# Patient Record
Sex: MALE | Race: WHITE | NOT HISPANIC OR LATINO | Employment: STUDENT | URBAN - METROPOLITAN AREA
[De-identification: names, ages, dates, MRNs, and addresses within clinical notes are randomized per-mention and may not be internally consistent; named-entity substitution may affect disease eponyms.]

---
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
## 2023-10-12 PROBLEM — Z00.00 ROUTINE GENERAL MEDICAL EXAMINATION AT A HEALTH CARE FACILITY: Status: ACTIVE | Noted: 2023-10-12

## 2023-10-12 PROBLEM — Z00.00 HEALTHCARE MAINTENANCE: Status: ACTIVE | Noted: 2023-10-12

## 2023-10-12 NOTE — ASSESSMENT & PLAN NOTE
Health care maintenance and core gaps reviewed and assessed with patient.  Vaccinations recommended:        Tetanus - O       Shingles - N/A       Influenza - O       Pneumonia - N/A   Colon cancer screening:   Colonoscopy: N/A  Lifestyle recommendations given.  Physical activity recommended.    Legend: Ordered (O), Declined (D), Current (C)

## 2023-10-13 ENCOUNTER — OFFICE VISIT (OUTPATIENT)
Dept: INTERNAL MEDICINE | Facility: CLINIC | Age: 23
End: 2023-10-13
Payer: COMMERCIAL

## 2023-10-13 ENCOUNTER — TELEPHONE (OUTPATIENT)
Dept: INTERNAL MEDICINE | Facility: CLINIC | Age: 23
End: 2023-10-13

## 2023-10-13 ENCOUNTER — CLINICAL SUPPORT (OUTPATIENT)
Dept: INTERNAL MEDICINE | Facility: CLINIC | Age: 23
End: 2023-10-13
Payer: COMMERCIAL

## 2023-10-13 VITALS
WEIGHT: 163.81 LBS | SYSTOLIC BLOOD PRESSURE: 122 MMHG | BODY MASS INDEX: 22.93 KG/M2 | DIASTOLIC BLOOD PRESSURE: 69 MMHG | HEIGHT: 71 IN | HEART RATE: 87 BPM

## 2023-10-13 DIAGNOSIS — Z00.00 ROUTINE GENERAL MEDICAL EXAMINATION AT A HEALTH CARE FACILITY: Primary | ICD-10-CM

## 2023-10-13 DIAGNOSIS — E78.5 HYPERLIPIDEMIA, UNSPECIFIED HYPERLIPIDEMIA TYPE: ICD-10-CM

## 2023-10-13 DIAGNOSIS — E03.8 SUBCLINICAL HYPOTHYROIDISM: ICD-10-CM

## 2023-10-13 DIAGNOSIS — F98.8 ATTENTION DEFICIT DISORDER (ADD) WITHOUT HYPERACTIVITY: ICD-10-CM

## 2023-10-13 DIAGNOSIS — Z00.00 ANNUAL PHYSICAL EXAM: Primary | ICD-10-CM

## 2023-10-13 DIAGNOSIS — Z00.00 HEALTHCARE MAINTENANCE: ICD-10-CM

## 2023-10-13 LAB
ALBUMIN SERPL BCP-MCNC: 4.5 G/DL (ref 3.5–5.2)
ALP SERPL-CCNC: 79 U/L (ref 55–135)
ALT SERPL W/O P-5'-P-CCNC: 29 U/L (ref 10–44)
ANION GAP SERPL CALC-SCNC: 9 MMOL/L (ref 8–16)
AST SERPL-CCNC: 19 U/L (ref 10–40)
BILIRUB SERPL-MCNC: 0.4 MG/DL (ref 0.1–1)
BUN SERPL-MCNC: 10 MG/DL (ref 6–20)
CALCIUM SERPL-MCNC: 9.6 MG/DL (ref 8.7–10.5)
CHLORIDE SERPL-SCNC: 102 MMOL/L (ref 95–110)
CHOLEST SERPL-MCNC: 429 MG/DL (ref 120–199)
CHOLEST/HDLC SERPL: 10 {RATIO} (ref 2–5)
CO2 SERPL-SCNC: 26 MMOL/L (ref 23–29)
CREAT SERPL-MCNC: 0.9 MG/DL (ref 0.5–1.4)
ERYTHROCYTE [DISTWIDTH] IN BLOOD BY AUTOMATED COUNT: 13.6 % (ref 11.5–14.5)
EST. GFR  (NO RACE VARIABLE): >60 ML/MIN/1.73 M^2
ESTIMATED AVG GLUCOSE: 103 MG/DL (ref 68–131)
GLUCOSE SERPL-MCNC: 102 MG/DL (ref 70–110)
HBA1C MFR BLD: 5.2 % (ref 4–5.6)
HCT VFR BLD AUTO: 46.1 % (ref 40–54)
HDLC SERPL-MCNC: 43 MG/DL (ref 40–75)
HDLC SERPL: 10 % (ref 20–50)
HGB BLD-MCNC: 15.4 G/DL (ref 14–18)
LDLC SERPL CALC-MCNC: 349.2 MG/DL (ref 63–159)
MCH RBC QN AUTO: 30.4 PG (ref 27–31)
MCHC RBC AUTO-ENTMCNC: 33.4 G/DL (ref 32–36)
MCV RBC AUTO: 91 FL (ref 82–98)
NONHDLC SERPL-MCNC: 386 MG/DL
PLATELET # BLD AUTO: 292 K/UL (ref 150–450)
PMV BLD AUTO: 9.4 FL (ref 9.2–12.9)
POTASSIUM SERPL-SCNC: 4 MMOL/L (ref 3.5–5.1)
PROT SERPL-MCNC: 7.5 G/DL (ref 6–8.4)
RBC # BLD AUTO: 5.06 M/UL (ref 4.6–6.2)
SODIUM SERPL-SCNC: 137 MMOL/L (ref 136–145)
T4 FREE SERPL-MCNC: 0.84 NG/DL (ref 0.71–1.51)
TRIGL SERPL-MCNC: 184 MG/DL (ref 30–150)
TSH SERPL DL<=0.005 MIU/L-ACNC: 4.72 UIU/ML (ref 0.4–4)
WBC # BLD AUTO: 7.3 K/UL (ref 3.9–12.7)

## 2023-10-13 PROCEDURE — 99385 PR PREVENTIVE VISIT,NEW,18-39: ICD-10-PCS | Mod: S$GLB,,, | Performed by: STUDENT IN AN ORGANIZED HEALTH CARE EDUCATION/TRAINING PROGRAM

## 2023-10-13 PROCEDURE — 84439 ASSAY OF FREE THYROXINE: CPT | Performed by: STUDENT IN AN ORGANIZED HEALTH CARE EDUCATION/TRAINING PROGRAM

## 2023-10-13 PROCEDURE — 80061 LIPID PANEL: CPT | Performed by: STUDENT IN AN ORGANIZED HEALTH CARE EDUCATION/TRAINING PROGRAM

## 2023-10-13 PROCEDURE — 99385 PREV VISIT NEW AGE 18-39: CPT | Mod: S$GLB,,, | Performed by: STUDENT IN AN ORGANIZED HEALTH CARE EDUCATION/TRAINING PROGRAM

## 2023-10-13 PROCEDURE — 83036 HEMOGLOBIN GLYCOSYLATED A1C: CPT | Performed by: STUDENT IN AN ORGANIZED HEALTH CARE EDUCATION/TRAINING PROGRAM

## 2023-10-13 PROCEDURE — 84443 ASSAY THYROID STIM HORMONE: CPT | Performed by: STUDENT IN AN ORGANIZED HEALTH CARE EDUCATION/TRAINING PROGRAM

## 2023-10-13 PROCEDURE — 99999 PR PBB SHADOW E&M-EST. PATIENT-LVL IV: ICD-10-PCS | Mod: PBBFAC,,, | Performed by: STUDENT IN AN ORGANIZED HEALTH CARE EDUCATION/TRAINING PROGRAM

## 2023-10-13 PROCEDURE — 99999 PR PBB SHADOW E&M-EST. PATIENT-LVL IV: CPT | Mod: PBBFAC,,, | Performed by: STUDENT IN AN ORGANIZED HEALTH CARE EDUCATION/TRAINING PROGRAM

## 2023-10-13 PROCEDURE — 80053 COMPREHEN METABOLIC PANEL: CPT | Performed by: STUDENT IN AN ORGANIZED HEALTH CARE EDUCATION/TRAINING PROGRAM

## 2023-10-13 PROCEDURE — 85027 COMPLETE CBC AUTOMATED: CPT | Performed by: STUDENT IN AN ORGANIZED HEALTH CARE EDUCATION/TRAINING PROGRAM

## 2023-10-13 NOTE — PROGRESS NOTES
Subjective:       Patient ID: Melinda Angel is a 23 y.o. male.    Chief Complaint: [employer]    HPI    Melinda Angel is a 23 y.o. male , English speaking, with a history of:  ADD      [Local Patient]  Originally from [facility]  Lives in: [place] [address]       Patient comes to the clinic a general medical health examination through [facility].    Patient is currently asymptomatic and has no complaints.  Patient is single, lives with 3 roommates.    He says he tries to have a healthy diet and cooks for himself.    He was diagnosed with ADD at age 11, at the moment he is not on medication.    Patient denies CV symptoms, CP, SOB, palpitations.  Patient denies constitutional symptoms, fever, changes in the urine or stool.    PMH:  Past Medical History:   Diagnosis Date    ADD (attention deficit disorder) 2012       PSH:  History reviewed. No pertinent surgical history.    FH:  History reviewed. No pertinent family history.    Allergies: Negative  Review of patient's allergies indicates:  No Known Allergies    Meds:    Current Outpatient Medications:     methylphenidate HCl (RITALIN) 10 MG tablet, Take 1 and 1/2 tablet po Q 3 pm daily for extended study time, Disp: 45 tablet, Rfl: 0    Social:  Single, no children, not dating at the moment.  Lives with 3 roommates.  Full time employed as     Exercise: Insufficient physical activity    Diet: Regular with no restrictions    Tobacco: Denies  Tobacco Use: Low Risk  (10/13/2023)    Patient History     Smoking Tobacco Use: Never     Smokeless Tobacco Use: Never     Passive Exposure: Not on file        Alcohol: Denies    Recreational drug use: Occasional marihuana    Recent travel: Denies      Most recent laboratories reviewed:    Recent Labs   Lab 10/13/23  0756   WBC 7.30   Hemoglobin 15.4   Hematocrit 46.1   MCV 91   Platelets 292       Recent Labs   Lab 10/13/23  0756   Glucose 102   Sodium 137   Potassium 4.0   BUN 10   Creatinine 0.9   Total  "Bilirubin 0.4   AST 19   ALT 29       Recent Labs   Lab 10/13/23  0756   Hemoglobin A1C 5.2       Recent Labs   Lab 10/13/23  0756   Cholesterol 429 H   Triglycerides 184 H   HDL 43   LDL Cholesterol 349.2 H   Non-HDL Cholesterol 386       Recent Labs   Lab 10/13/23  0756   TSH 4.716 H               Healthcare Maintenance:  Colon cancer screening:   N/A  Vaccinations: Pneumonia, Zoster, Tetanus (no)  COVID vaccination: completed  x 3   Depression screening: PHQ2 score = 0    Annual visit approx. Month: October ROS  11-point review of systems done. Negative except for detailed in the HPI.    Occasional dry cough, unknown cause.      Objective:      /69   Pulse 87   Ht 5' 11" (1.803 m)   Wt 74.3 kg (163 lb 12.8 oz)   BMI 22.85 kg/m²      Physical Exam   General appearance: Good general aspect, NAD, conversant   Eyes and HEENT: Normal sclerae, moist mucous membranes, no thyromegaly or lymphadenopathy  Respiratory: No work of breathing, clear to auscultation bilaterally. No rales, rhonchi, wheezing, or rubs.  Cardiovascular: PMI not displaced. RRR. Normal S1, S2. No murmurs, rubs or gallops.  Abdomen and : Soft, non-tender, nondistended, BS, no hepatosplenomegaly, no masses.  Extremities: no edemas, no extremity lymphadenopathy, no clubbing, no cyanosis.  Nervous System: Alert and oriented x 3, good concentration, no deficits.  Skin: Normal temperature, turgor and texture; no rash, ulcers or subcutaneous nodules  Psych: Appropriate affect, alert and oriented to person, place and time          Assessment:       1. Routine general medical examination at a health care facility  Assessment & Plan:  Patient is in overall good general health.  Stable medical conditions listed on the PMH.  Labs reviewed and patient notified.        2. Attention deficit disorder (ADD) without hyperactivity  Overview:  Dx at age 11    Assessment & Plan:  Stable  Not on medication      3. Hyperlipidemia, unspecified hyperlipidemia " "type  Assessment & Plan:  Familial?  Lab Results   Component Value Date    CHOL 429 (H) 10/13/2023     Lab Results   Component Value Date    HDL 43 10/13/2023     Lab Results   Component Value Date    LDLCALC 349.2 (H) 10/13/2023     No results found for: "DLDL"  Lab Results   Component Value Date    TRIG 184 (H) 10/13/2023       f1   Lab Results   Component Value Date    CHOLHDL 10.0 (L) 10/13/2023     Will refer to endocrinology for evaluation.  Low-cholesterol / lipid diet recommended  Physical activity recommended.    Orders:  -     Ambulatory referral/consult to Endocrinology; Future; Expected date: 10/20/2023    4. Subclinical hypothyroidism  Assessment & Plan:  Asymptomatic.  Lab Results   Component Value Date    TSH 4.716 (H) 10/13/2023     Will refer to endocrinology for evaluation in view of very elevated lipids.      5. Healthcare maintenance  Assessment & Plan:  Health care maintenance and core gaps reviewed and assessed with patient.  Vaccinations recommended:        Tetanus - O       Shingles - N/A       Influenza - O       Pneumonia - N/A   Colon cancer screening:   Colonoscopy: N/A  Lifestyle recommendations given.  Physical activity recommended.    Legend: Ordered (O), Declined (D), Current (C)      Orders:  -     Tdap Vaccine; Future  -     Influenza - Quadrivalent (PF); Future; Expected date: 10/13/2023       Plan:       Vaccinations ordered: Influenza, Tdap  Referral to endocrinology.      Will assume as PCP.      Patient Instructions   Get vaccinated against influenza and tetanus.  Recommended to increase frequency of physical activity       Education provided  Lifestyle recommendations given  AVS generated, explained, and sent to the patient through the patient portal.  RTC in : 1 year for annual wellness visit, labs not ordered yet          YANCI VILLAVICENCIO MD, MPH  Internal Medicine  [facility]  [facility]          "

## 2023-10-13 NOTE — PATIENT INSTRUCTIONS
Get vaccinated against influenza and tetanus.  Recommended to increase frequency of physical activity

## 2023-10-13 NOTE — ASSESSMENT & PLAN NOTE
Asymptomatic.  Lab Results   Component Value Date    TSH 4.716 (H) 10/13/2023     Will refer to endocrinology for evaluation in view of very elevated lipids.

## 2023-10-27 ENCOUNTER — OFFICE VISIT (OUTPATIENT)
Dept: ENDOCRINOLOGY | Facility: CLINIC | Age: 23
End: 2023-10-27
Payer: COMMERCIAL

## 2023-10-27 VITALS
HEART RATE: 84 BPM | WEIGHT: 158.94 LBS | OXYGEN SATURATION: 97 % | SYSTOLIC BLOOD PRESSURE: 100 MMHG | BODY MASS INDEX: 22.17 KG/M2 | DIASTOLIC BLOOD PRESSURE: 66 MMHG

## 2023-10-27 DIAGNOSIS — E03.8 SUBCLINICAL HYPOTHYROIDISM: ICD-10-CM

## 2023-10-27 DIAGNOSIS — E78.5 HYPERLIPIDEMIA, UNSPECIFIED HYPERLIPIDEMIA TYPE: Primary | ICD-10-CM

## 2023-10-27 PROCEDURE — 99204 OFFICE O/P NEW MOD 45 MIN: CPT | Mod: S$GLB,,, | Performed by: INTERNAL MEDICINE

## 2023-10-27 PROCEDURE — 99999 PR PBB SHADOW E&M-EST. PATIENT-LVL III: ICD-10-PCS | Mod: PBBFAC,,, | Performed by: INTERNAL MEDICINE

## 2023-10-27 PROCEDURE — 99999 PR PBB SHADOW E&M-EST. PATIENT-LVL III: CPT | Mod: PBBFAC,,, | Performed by: INTERNAL MEDICINE

## 2023-10-27 PROCEDURE — 99204 PR OFFICE/OUTPT VISIT, NEW, LEVL IV, 45-59 MIN: ICD-10-PCS | Mod: S$GLB,,, | Performed by: INTERNAL MEDICINE

## 2023-10-27 NOTE — PROGRESS NOTES
Subjective:      Patient ID: Melinda Angel is referred by Jania Saleh MD     Chief Complaint:  Hyperlipidemia    History of Present Illness    Melinda Angel is a 23 y.o. male who presents for evaluation of hyperlipidemia.    Patient noted to have elevated LDL and total cholesterol during his routine physical exam.    Lab Results   Component Value Date    LDLCALC 349.2 (H) 10/13/2023    CHOL 429 (H) 10/13/2023    TRIG 184 (H) 10/13/2023    HDL 43 10/13/2023     Fhx of hyperlipidemia: Mother, maternal uncle and grandfather.  No diagnosis of familial hypercholesterolemia.  Cholesterol deposits under the skin: Denies   Premature coronary heart disease family member: Unknown, Paternal grandmother had MI.   Sudden premature cardiac death in a family member: Denies    Patient says recently he had been taking more fast food due to cost constraints.  Patient says he will make changes to his diet and lifestyle.      Hypothyroidism    Recent TSH was elevated with a normal free T4.  Prior labs not available for review.  No previous thyroid hormone use.    Thyroid symptoms:  On and off fatigue.    Family history of thyroid disease: Denies    Lab Results   Component Value Date    TSH 4.716 (H) 10/13/2023    FREET4 0.84 10/13/2023       ROS:   As above    Objective:     /66 (BP Location: Right arm, Patient Position: Sitting, BP Method: Large (Automatic))   Pulse 84   Wt 72.1 kg (158 lb 15.2 oz)   SpO2 97%   BMI 22.17 kg/m²     Body mass index is 22.17 kg/m².      Physical Exam  Constitutional:       General: He is not in acute distress.     Appearance: He is not ill-appearing.   HENT:      Head: Normocephalic.   Eyes:      Conjunctiva/sclera: Conjunctivae normal.   Cardiovascular:      Rate and Rhythm: Normal rate.   Pulmonary:      Effort: Pulmonary effort is normal.   Musculoskeletal:      Right lower leg: No edema.      Left lower leg: No edema.   Skin:     General: Skin is warm and dry.   Neurological:       Mental Status: He is alert and oriented to person, place, and time.       Lab Review:   Lab Results   Component Value Date    HGBA1C 5.2 10/13/2023     Lab Results   Component Value Date    CHOL 429 (H) 10/13/2023    HDL 43 10/13/2023    LDLCALC 349.2 (H) 10/13/2023    TRIG 184 (H) 10/13/2023    CHOLHDL 10.0 (L) 10/13/2023     Lab Results   Component Value Date     10/13/2023    K 4.0 10/13/2023     10/13/2023    CO2 26 10/13/2023     10/13/2023    BUN 10 10/13/2023    CREATININE 0.9 10/13/2023    CALCIUM 9.6 10/13/2023    PROT 7.5 10/13/2023    ALBUMIN 4.5 10/13/2023    BILITOT 0.4 10/13/2023    ALKPHOS 79 10/13/2023    AST 19 10/13/2023    ALT 29 10/13/2023    ANIONGAP 9 10/13/2023    EGFRNORACEVR >60.0 10/13/2023    TSH 4.716 (H) 10/13/2023        Assessment and Plan     Problem List Items Addressed This Visit          Cardiac/Vascular    Hyperlipidemia - Primary       Elevated LDL and total cholesterol during his routine physical exam.  LDL of 349, concerning for FH.  No known family history FH but has hyperlipidemia in his mother, maternal uncle and grandfather.  Family history of CAD.    Discussed referral the genetic clinic for evaluation of FH.  Encouraged good diet and lifestyle modification.   Repeat labs and have discussed starting statins with the patient.             Relevant Orders    Ambulatory referral/consult to Genetics    LDL Cholesterol, Direct Measurement       Endocrine    Subclinical hypothyroidism       Recent TSH was elevated with a normal free T4.  Reports on and off fatigue.  Repeat thyroid function test in 4 weeks.  Discussed starting levothyroxine if he has persistent elevated TSH.             Relevant Orders    TSH    T4, Free          Victoria GLOVER Rai, MD

## 2023-11-13 NOTE — ASSESSMENT & PLAN NOTE
Recent TSH was elevated with a normal free T4.  Reports on and off fatigue.  Repeat thyroid function test in 4 weeks.  Discussed starting levothyroxine if he has persistent elevated TSH.

## 2023-12-08 ENCOUNTER — LAB VISIT (OUTPATIENT)
Dept: LAB | Facility: HOSPITAL | Age: 23
End: 2023-12-08
Payer: COMMERCIAL

## 2023-12-08 DIAGNOSIS — E78.5 HYPERLIPIDEMIA, UNSPECIFIED HYPERLIPIDEMIA TYPE: ICD-10-CM

## 2023-12-08 DIAGNOSIS — E03.8 SUBCLINICAL HYPOTHYROIDISM: ICD-10-CM

## 2023-12-08 LAB
T4 FREE SERPL-MCNC: 0.78 NG/DL (ref 0.71–1.51)
TSH SERPL DL<=0.005 MIU/L-ACNC: 1.89 UIU/ML (ref 0.4–4)

## 2023-12-08 PROCEDURE — 83701 LIPOPROTEIN BLD HR FRACTION: CPT | Performed by: INTERNAL MEDICINE

## 2023-12-08 PROCEDURE — 36415 COLL VENOUS BLD VENIPUNCTURE: CPT | Mod: PN | Performed by: INTERNAL MEDICINE

## 2023-12-08 PROCEDURE — 84439 ASSAY OF FREE THYROXINE: CPT | Performed by: INTERNAL MEDICINE

## 2023-12-08 PROCEDURE — 84443 ASSAY THYROID STIM HORMONE: CPT | Performed by: INTERNAL MEDICINE

## 2023-12-10 LAB — LDLC SERPL-MCNC: 318 MG/DL

## 2023-12-12 DIAGNOSIS — E78.2 MIXED HYPERLIPIDEMIA: Primary | ICD-10-CM

## 2023-12-12 RX ORDER — ROSUVASTATIN CALCIUM 20 MG/1
20 TABLET, COATED ORAL DAILY
Qty: 30 TABLET | Refills: 11 | Status: SHIPPED | OUTPATIENT
Start: 2023-12-12 | End: 2024-01-11 | Stop reason: DRUGHIGH

## 2023-12-14 ENCOUNTER — PATIENT MESSAGE (OUTPATIENT)
Dept: INTERNAL MEDICINE | Facility: CLINIC | Age: 23
End: 2023-12-14
Payer: COMMERCIAL

## 2024-01-09 ENCOUNTER — LAB VISIT (OUTPATIENT)
Dept: LAB | Facility: HOSPITAL | Age: 24
End: 2024-01-09
Payer: COMMERCIAL

## 2024-01-09 DIAGNOSIS — E78.2 MIXED HYPERLIPIDEMIA: ICD-10-CM

## 2024-01-09 LAB
CHOLEST SERPL-MCNC: 390 MG/DL (ref 120–199)
CHOLEST/HDLC SERPL: 8.5 {RATIO} (ref 2–5)
HDLC SERPL-MCNC: 46 MG/DL (ref 40–75)
HDLC SERPL: 11.8 % (ref 20–50)
LDLC SERPL CALC-MCNC: 314.4 MG/DL (ref 63–159)
NONHDLC SERPL-MCNC: 344 MG/DL
TRIGL SERPL-MCNC: 148 MG/DL (ref 30–150)

## 2024-01-09 PROCEDURE — 36415 COLL VENOUS BLD VENIPUNCTURE: CPT | Mod: PN | Performed by: INTERNAL MEDICINE

## 2024-01-09 PROCEDURE — 80061 LIPID PANEL: CPT | Performed by: INTERNAL MEDICINE

## 2024-01-11 DIAGNOSIS — E78.2 MIXED HYPERLIPIDEMIA: Primary | ICD-10-CM

## 2024-01-11 DIAGNOSIS — E78.2 MIXED HYPERLIPIDEMIA: ICD-10-CM

## 2024-01-11 RX ORDER — ROSUVASTATIN CALCIUM 40 MG/1
40 TABLET, COATED ORAL NIGHTLY
Qty: 30 TABLET | Refills: 11 | Status: SHIPPED | OUTPATIENT
Start: 2024-01-11 | End: 2025-01-10

## 2024-01-11 RX ORDER — ROSUVASTATIN CALCIUM 40 MG/1
40 TABLET, COATED ORAL NIGHTLY
Qty: 30 TABLET | Refills: 11 | Status: SHIPPED | OUTPATIENT
Start: 2024-01-11 | End: 2024-01-11 | Stop reason: SDUPTHER

## 2024-01-15 PROBLEM — Z00.00 ROUTINE GENERAL MEDICAL EXAMINATION AT A HEALTH CARE FACILITY: Status: RESOLVED | Noted: 2023-10-12 | Resolved: 2024-01-15

## 2024-01-15 PROBLEM — Z00.00 HEALTHCARE MAINTENANCE: Status: RESOLVED | Noted: 2023-10-12 | Resolved: 2024-01-15

## 2024-01-29 ENCOUNTER — OFFICE VISIT (OUTPATIENT)
Dept: GENETICS | Facility: CLINIC | Age: 24
End: 2024-01-29
Payer: COMMERCIAL

## 2024-01-29 ENCOUNTER — LAB VISIT (OUTPATIENT)
Dept: LAB | Facility: HOSPITAL | Age: 24
End: 2024-01-29
Attending: MEDICAL GENETICS
Payer: COMMERCIAL

## 2024-01-29 DIAGNOSIS — E78.5 HYPERLIPIDEMIA, UNSPECIFIED HYPERLIPIDEMIA TYPE: ICD-10-CM

## 2024-01-29 PROCEDURE — 96040 PR GENETIC COUNSELING, EACH 30 MIN: CPT | Mod: S$GLB,,, | Performed by: GENETIC COUNSELOR, MS

## 2024-01-29 PROCEDURE — 99999 PR PBB SHADOW E&M-EST. PATIENT-LVL III: CPT | Mod: PBBFAC,,, | Performed by: GENETIC COUNSELOR, MS

## 2024-01-29 PROCEDURE — 36415 COLL VENOUS BLD VENIPUNCTURE: CPT | Performed by: MEDICAL GENETICS

## 2024-01-29 NOTE — PROGRESS NOTES
Melinda Angel   : 2000  MRN: [id number]    REFERRING MD: Victoria Paige MD    REASON FOR CONSULT: Our [facility] Genetic Service was asked to provide genetic counseling for this 23 y.o.-year-old male with high cholesterol. He presents unaccompanied for today's visit.     HISTORY OF PRESENT ILLNESS: Mr. Angel is a 23-year-old male with high cholesterol and a family history of high cholesterol. His most recent total cholesterol was 390mg/dL down from 429mg/dL three months ago. LDL was 314mg/dL. He is followed by endocrinology for his cholesterol. He started taking a statin recently and is tolerating it well.     MEDICAL HISTORY:  Active Problem List with Overview Notes    Diagnosis Date Noted    Subclinical hypothyroidism 10/13/2023    Hyperlipidemia 10/13/2023    ADD (attention deficit disorder) 2012     Dx at age 11       FAMILY HISTORY:  Mr. Angel does not have any children. He has two sisters age 27 and 28 who are healthy. His older sister has a 6-month-old son. His mother is 50 and has very high cholesterol. His maternal aunt also has high cholesterol. His father is 55 and has high cholesterol. He believes his two paternal aunts also have high cholesterol. His paternal grandmother had an MI. There is no other known family history of coronary artery disease.         IMPRESSION: Melinda Angel is a 23-year-old male with hyperlipidemia and concern for familial hypercholesterolemia.     FH is the most common inherited cardiovascular disease. It is an autosomal dominant condition characterized by severely elevated LDL cholesterol (LDL-C) levels that lead to atherosclerotic plaque deposition in the coronary arteries and proximal aorta at an early age. It leads to an increased risk for cardiovascular disease. Some individuals may develop xanthomas around the eyelids, tendons of the elbows, hands, knees, and feet. Left untreated, women are at a 30% risk for a coronary event by age 60 years and men are at 50% risk by age  50 years.     The 4 gene Familial Hypercholesterolemia (FH) panel from [employer] (APOB, LDLR, LDLRAP1, PCSK9) was ordered. We discussed that if the patient is positive, his first-degree relatives would be at 50% risk. We discussed that even if his is negative or has a variant of uncertain significance (VUS), his at-risk family members should have their cholesterol regularly screened. Because FH is relatively common in the general population, it is also possible to have homozygous FH (HoFH) in which there are two pathogenic variants on opposite alleles.     Mr. Angel has not been seen by cardiology but mentioned he sometimes has tachycardia and is concerned about his heart. Given his high cholesterol and the strong family history, I'll place a referral     We will plan to follow-up once results return.     RECOMMENDATIONS:   Hypercholesterolemia panel from [facility]   Referral to cardiology   Follow-up once results return     TIME SPENT: 20 minutes     Yun Kauffman, MPH, MS, [facility]  Genetic Counselor   [facility]    Katelyn Simmons M.D.                                                                                   Medical Geneticist                                                                                                               [facility]

## 2024-02-21 ENCOUNTER — HOSPITAL ENCOUNTER (EMERGENCY)
Facility: HOSPITAL | Age: 24
Discharge: HOME | End: 2024-02-21
Attending: STUDENT IN AN ORGANIZED HEALTH CARE EDUCATION/TRAINING PROGRAM
Payer: COMMERCIAL

## 2024-02-21 VITALS
WEIGHT: 150 LBS | HEIGHT: 71 IN | DIASTOLIC BLOOD PRESSURE: 77 MMHG | RESPIRATION RATE: 18 BRPM | SYSTOLIC BLOOD PRESSURE: 119 MMHG | HEART RATE: 120 BPM | TEMPERATURE: 100 F | BODY MASS INDEX: 21 KG/M2 | OXYGEN SATURATION: 99 %

## 2024-02-21 DIAGNOSIS — J40 BRONCHITIS: Primary | ICD-10-CM

## 2024-02-21 DIAGNOSIS — J30.2 SEASONAL ALLERGIC RHINITIS, UNSPECIFIED TRIGGER: ICD-10-CM

## 2024-02-21 DIAGNOSIS — R05.9 COUGH: ICD-10-CM

## 2024-02-21 LAB
BILIRUB UR QL STRIP: NEGATIVE
CLARITY UR: CLEAR
COLOR UR: YELLOW
CTP QC/QA: YES
GLUCOSE UR QL STRIP: NEGATIVE
HGB UR QL STRIP: NEGATIVE
KETONES UR QL STRIP: NEGATIVE
LEUKOCYTE ESTERASE UR QL STRIP: NEGATIVE
MOLECULAR STREP A: NEGATIVE
NITRITE UR QL STRIP: NEGATIVE
PH UR STRIP: 6 [PH] (ref 5–8)
POC MOLECULAR INFLUENZA A AGN: NEGATIVE
POC MOLECULAR INFLUENZA B AGN: NEGATIVE
PROT UR QL STRIP: NEGATIVE
SARS-COV-2 RDRP RESP QL NAA+PROBE: NEGATIVE
SP GR UR STRIP: 1.02 (ref 1–1.03)
URN SPEC COLLECT METH UR: NORMAL
UROBILINOGEN UR STRIP-ACNC: NEGATIVE EU/DL

## 2024-02-21 PROCEDURE — 25000003 PHARM REV CODE 250

## 2024-02-21 PROCEDURE — 87635 SARS-COV-2 COVID-19 AMP PRB: CPT

## 2024-02-21 PROCEDURE — 87651 STREP A DNA AMP PROBE: CPT

## 2024-02-21 PROCEDURE — 87502 INFLUENZA DNA AMP PROBE: CPT

## 2024-02-21 PROCEDURE — 81003 URINALYSIS AUTO W/O SCOPE: CPT

## 2024-02-21 PROCEDURE — 99284 EMERGENCY DEPT VISIT MOD MDM: CPT | Mod: 25

## 2024-02-21 PROCEDURE — 87070 CULTURE OTHR SPECIMN AEROBIC: CPT

## 2024-02-21 RX ORDER — FLUTICASONE PROPIONATE 50 MCG
1 SPRAY, SUSPENSION (ML) NASAL 2 TIMES DAILY PRN
Qty: 15 G | Refills: 0 | Status: SHIPPED | OUTPATIENT
Start: 2024-02-21

## 2024-02-21 RX ORDER — ALBUTEROL SULFATE 90 UG/1
1-2 AEROSOL, METERED RESPIRATORY (INHALATION) EVERY 6 HOURS PRN
Qty: 8 G | Refills: 0 | Status: SHIPPED | OUTPATIENT
Start: 2024-02-21 | End: 2025-02-20

## 2024-02-21 RX ORDER — PREDNISONE 20 MG/1
40 TABLET ORAL DAILY
Qty: 10 TABLET | Refills: 0 | Status: SHIPPED | OUTPATIENT
Start: 2024-02-21 | End: 2024-02-26

## 2024-02-21 RX ORDER — CETIRIZINE HYDROCHLORIDE 10 MG/1
10 TABLET ORAL DAILY
Qty: 30 TABLET | Refills: 0 | Status: SHIPPED | OUTPATIENT
Start: 2024-02-21 | End: 2024-03-22

## 2024-02-21 RX ORDER — ACETAMINOPHEN 500 MG
1000 TABLET ORAL
Status: COMPLETED | OUTPATIENT
Start: 2024-02-21 | End: 2024-02-21

## 2024-02-21 RX ADMIN — ACETAMINOPHEN 1000 MG: 500 TABLET ORAL at 04:02

## 2024-02-21 NOTE — ED PROVIDER NOTES
Encounter Date: 2/21/2024       History     Chief Complaint   Patient presents with    Cough     Pt c/o productive cough x4 months, and increased mucus production.     Patient is a 23-year-old male with a past medical history of ADD, hyperlipidemia who presents to the emergency department for evaluation dry cough x 4 months.  Reports associated headache, chills that started today.  Denies hemoptysis.  Reports he was sick recently with a viral illness.  Reports he is vaccinated for COVID and flu.  Denies sore throat, difficulty swallowing, otalgia.  Denies fever.  Denies chest pain, shortness of breath, abdominal pain.    The history is provided by the patient.     Review of patient's allergies indicates:  No Known Allergies  Past Medical History:   Diagnosis Date    ADD (attention deficit disorder) 2012     History reviewed. No pertinent surgical history.  Family History   Problem Relation Age of Onset    Hyperlipidemia Mother     Hypercalcemia Maternal Grandfather      Social History     Tobacco Use    Smoking status: Some Days     Types: Cigarettes    Smokeless tobacco: Never   Substance Use Topics    Alcohol use: Never    Drug use: Yes     Types: Marijuana     Comment: ocassionally     Review of Systems   Constitutional:  Positive for chills. Negative for fever.   HENT:  Negative for congestion, ear pain, rhinorrhea, sore throat and trouble swallowing.    Respiratory:  Positive for cough. Negative for shortness of breath.    Cardiovascular:  Negative for chest pain.   Gastrointestinal:  Negative for abdominal pain, nausea and vomiting.   Musculoskeletal:  Negative for neck pain and neck stiffness.   Neurological:  Positive for headaches.       Physical Exam     Initial Vitals [02/21/24 1550]   BP Pulse Resp Temp SpO2   119/77 (!) 120 18 99.8 °F (37.7 °C) 100 %      MAP       --         Physical Exam    Nursing note and vitals reviewed.  Constitutional: He appears well-developed and well-nourished.   HENT:   Head:  Normocephalic and atraumatic.   Right Ear: External ear normal.   Left Ear: External ear normal.   There is mild posterior oropharyngeal erythema but a postnasal drip is present, no tonsillar swelling, no oropharyngeal exudates, uvula is midline. Normal dentition. No trismus.  No muffled voice. No submandibular swelling. Patient is tolerating secretions without difficulty.  Patient is speaking in full sentences on exam without difficulty.  Bilateral tympanic membranes are pearly gray without erythema, bulging, perforation.  There is no postauricular swelling, or overlying erythema or tenderness to palpation over mastoids bilaterally.    Neck: Carotid bruit is not present.   Normal range of motion.  Cardiovascular:  Regular rhythm, normal heart sounds and intact distal pulses.   Tachycardia present.   Exam reveals no gallop and no friction rub.       No murmur heard.  Pulmonary/Chest: Breath sounds normal. No respiratory distress. He has no wheezes. He has no rhonchi. He has no rales.   Abdominal: Abdomen is soft. Bowel sounds are normal. He exhibits no distension. There is no abdominal tenderness. There is no rebound and no guarding.   Musculoskeletal:         General: Normal range of motion.      Cervical back: Normal range of motion.     Neurological: He is alert and oriented to person, place, and time. GCS score is 15. GCS eye subscore is 4. GCS verbal subscore is 5. GCS motor subscore is 6.   Psychiatric: He has a normal mood and affect.         ED Course   Procedures  Labs Reviewed   CULTURE, RESPIRATORY  - THROAT   URINALYSIS, REFLEX TO URINE CULTURE    Narrative:     Specimen Source->Urine   SARS-COV-2 RDRP GENE   POCT INFLUENZA A/B MOLECULAR   POCT STREP A MOLECULAR          Imaging Results              X-Ray Chest PA And Lateral (Final result)  Result time 02/21/24 16:10:09      Final result by Lucas Daniel MD (02/21/24 16:10:09)                   Impression:      No acute radiographic findings in the  chest.      Electronically signed by: Lucas Daniel MD  Date:    02/21/2024  Time:    16:10               Narrative:    EXAMINATION:  XR CHEST PA AND LATERAL    CLINICAL HISTORY:  Cough, unspecified    TECHNIQUE:  PA and lateral views of the chest were performed.    COMPARISON:  None    FINDINGS:  The lungs are clear, with normal appearance of pulmonary vasculature and no pleural effusion or pneumothorax.    The cardiac silhouette is normal in size. The hilar and mediastinal contours are unremarkable.    Visualized osseous structures show no acute abnormalities.                                       Medications   acetaminophen tablet 1,000 mg (1,000 mg Oral Given 2/21/24 1091)     Medical Decision Making  This is an emergent evaluation of a 23-year-old male with a past medical history of ADD, hyperlipidemia who presents to the emergency department for evaluation dry cough x 4 months.  Reports associated headache, chills that started today.      On physical exam, patient is well-appearing acute distress. There is mild posterior oropharyngeal erythema but a postnasal drip is present, no tonsillar swelling, no oropharyngeal exudates, uvula is midline. Normal dentition. No trismus.  No muffled voice. No submandibular swelling. Patient is tolerating secretions without difficulty.  Patient is speaking in full sentences on exam without difficulty.  Bilateral tympanic membranes are pearly gray without erythema, bulging, perforation.  There is no postauricular swelling, or overlying erythema or tenderness to palpation over mastoids bilaterally. Regular rhythm, slightly tachycardic, without murmurs.  No carotid bruits appreciated on exam. Lungs are clear to auscultation bilaterally.  Abdomen is soft, nontender, non distended, with normal bowel sounds.     Differential diagnosis includes but is not limited to COVID, flu, viral URI, seasonal allergies, pneumonia, bronchitis.    Workup initiated with viral swabs, urinalysis,  chest x-ray.  Ordered Tylenol.  Patient has a temp 99.8 and a heart rate of 20, I suspect heart rate elevated due patient trying to spike a fever.  However, patient reports he chronically has a higher heart rate.  Chest x-ray shows no acute abnormalities, no consolidation consistent with a pneumonia.  COVID, flu, strep negative.  Ordered urinalysis.  Urinalysis shows no signs of infection, negative for nitrites or leukocytes.  My overall impression is likely bronchitis secondary to recent viral URI.  Will discharge home with Zyrtec, Flonase, albuterol inhaler, 5 day course of prednisone. Patient is very well appearing, and in no acute distress. Vital signs are reassuring here in the emergency department, patient is afebrile, breathing comfortable, satting 100 % on room air. Patient/Caregiver is stable for discharge at this time. Patient/Caregiver verbalize understanding of care plan. All questions and concerns were addressed. Discussed strict return precautions with the patient/caregiver. Instructed follow up with primary care provider within 1 week.      Miguel Angel Reynolds PA-C    DISCLAIMER: This note was prepared with [employer] voice recognition transcription software. Garbled syntax, mangled pronouns, and other bizarre constructions may be attributed to that software system.     Amount and/or Complexity of Data Reviewed  Labs: ordered.  Radiology: ordered.    Risk  OTC drugs.  Prescription drug management.                                      Clinical Impression:  Final diagnoses:  [R05.9] Cough  [J40] Bronchitis (Primary)  [J30.2] Seasonal allergic rhinitis, unspecified trigger          ED Disposition Condition    Discharge Stable          ED Prescriptions       Medication Sig Dispense Start Date End Date Auth. Provider    cetirizine (ZYRTEC) 10 MG tablet Take 1 tablet (10 mg total) by mouth once daily. 30 tablet 2/21/2024 3/22/2024 Miguel Angel Reynolds PA-C    fluticasone propionate (FLONASE) 50 mcg/actuation nasal  spray 1 spray (50 mcg total) by Each Nostril route 2 (two) times daily as needed for Rhinitis. 15 g 2/21/2024 -- Miguel Angel Reynolds PA-C    albuterol (PROVENTIL/VENTOLIN HFA) 90 mcg/actuation inhaler Inhale 1-2 puffs into the lungs every 6 (six) hours as needed. Rescue 8 g 2/21/2024 2/20/2025 Miguel Angel Reynolds PA-C    predniSONE (DELTASONE) 20 MG tablet Take 2 tablets (40 mg total) by mouth once daily. for 5 days 10 tablet 2/21/2024 2/26/2024 Miguel Angel Reynolds PA-C          Follow-up Information       Follow up With Specialties Details Why Contact Info    [facility] - Emergency Dept Emergency Medicine Go to  As needed, If symptoms worsen, or new symptoms develop [address] [facility] [place] [address] [phone]    Jania Saleh MD Internal Medicine   [address] [facility] [address]  [phone]                      Miguel Angel Reynolds PA-C  02/21/24 0034

## 2024-02-21 NOTE — DISCHARGE INSTRUCTIONS
You were diagnosed with bronchitis, seasonal allergies here in the ER today. Please take all medications as prescribed for symptoms and follow up with your primary care provider. Return to the ER for any new or worsening symptoms. It was a pleasure taking care of you today, I hope you feel better!    Thank you for coming to our Emergency Department today. It is important to remember that some problems are difficult to diagnose and may not be found during your Emergency Department visit. Be sure to follow up with your primary care doctor and review all labs/imaging/tests that were performed during this visit with them. Some labs/tests may be outside of the normal range and require non-emergent follow-up and further investigation to help diagnose/exclude/prevent complications or other medical conditions.    If you do not have a primary care doctor, you may contact the one listed on your discharge paperwork or you may also call the [facility] Appointment Desk at [phone] to schedule an appointment and establish care with one. It is important to your health that you have a primary care doctor.    Please take all medications as directed. All medications may potentially have side-effects and it is impossible to predict which medications may give you side-effects or what side-effects (if any) they will give you.. If you feel that you are having a negative effect or side-effect of any medication you should immediately stop taking them and seek medical attention. If you feel that you are having a life-threatening reaction call 911.    Return to the ER with any questions/concerns, new/concerning symptoms, worsening or failure to improve.     Do not drive, swim, climb to height, take a bath or make any important decisions for 24 hours if you have received any pain medications, sedatives or mood altering drugs during your ER visit.

## 2024-02-23 ENCOUNTER — TELEPHONE (OUTPATIENT)
Dept: GENETICS | Facility: CLINIC | Age: 24
End: 2024-02-23
Payer: COMMERCIAL

## 2024-02-23 LAB — BACTERIA THROAT CULT: NORMAL

## 2024-02-23 NOTE — TELEPHONE ENCOUNTER
Spoke with pt  to schedule appt with GC. Pt scheduled virtual appt for 2/28 at at 10am. Pt understood and confirmed appt.      ----- Message from Yun Kauffman CGC sent at 2/23/2024  8:04 AM CST -----  Hi can we add a virtual results visit with him Wed morning? Thanks!

## 2024-02-27 ENCOUNTER — TELEPHONE (OUTPATIENT)
Dept: GENETICS | Facility: CLINIC | Age: 24
End: 2024-02-27
Payer: COMMERCIAL

## 2024-02-27 LAB
GENETIC COUNSELING?: YES
GENSO SPECIMEN TYPE: NORMAL
MISCELLANEOUS GENETIC TEST NAME: NORMAL
PARTENTAL OR SIBLING TESTING?: NO
REFERENCE LAB: NORMAL
TEST RESULT: NORMAL

## 2024-02-28 ENCOUNTER — OFFICE VISIT (OUTPATIENT)
Dept: GENETICS | Facility: CLINIC | Age: 24
End: 2024-02-28
Payer: COMMERCIAL

## 2024-02-28 DIAGNOSIS — E78.01: Primary | ICD-10-CM

## 2024-02-28 PROCEDURE — 96040 PR GENETIC COUNSELING, EACH 30 MIN: CPT | Mod: 95,,, | Performed by: GENETIC COUNSELOR, MS

## 2024-02-28 NOTE — PROGRESS NOTES
Melinda Angel   : 2000  MRN: [id number]    TELEMEDICINE VIDEO VISIT     The patient location is: [facility]  The chief complaint leading to consultation is: results   Total time spent with patient: 15 minutes   Visit type: Virtual visit with synchronous audio and video     Each patient to whom he or she provides medical services by telemedicine is: (1) informed of the relationship between the physician and patient and the respective role of any other health care provider with respect to management of the patient; and (2) notified that he or she may decline to receive medical services by telemedicine and may withdraw from such care at any time.    REFERRING MD: Self, Aaareferral    HISTORY OF PRESENT ILLNESS: I have seen this 23-year-old male with high cholesterol and a family history of high cholesterol. His most recent total cholesterol was 390mg/dL down from 429mg/dL three months ago. LDL was 314mg/dL. He is followed by endocrinology for his cholesterol. He started taking a statin recently and is tolerating it well.     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 This result is further summarized below.     MEDICAL HISTORY:  Active Problem List with Overview Notes    Diagnosis Date Noted    Subclinical hypothyroidism 10/13/2023    Hyperlipidemia 10/13/2023    ADD (attention deficit disorder) 2012     Dx at age 11       GENETIC TESTING:      FAMILY HISTORY:  Mr. Angel does not have any children. He has two sisters age 27 and 28 who are healthy. His older sister has a 6-month-old son. His mother is 50 and has very high cholesterol. His maternal aunt also has high cholesterol. His father is 55 and has high cholesterol. He believes his two paternal aunts also have high cholesterol. His paternal grandmother had an MI. There is no other known family history of coronary artery disease.         IMPRESSION: Melinda Angel is a 23-year-old  male with a pathogenic variant in LDLR associated with familial hypercholesterolemia (FH).     FH is the most common inherited cardiovascular disease. It is an autosomal dominant condition characterized by severely elevated LDL cholesterol (LDL-C) levels that lead to atherosclerotic plaque deposition in the coronary arteries and proximal aorta at an early age. It leads to an increased risk for cardiovascular disease. Some individuals may develop xanthomas around the eyelids, tendons of the elbows, hands, knees, and feet. Left untreated, women are at a 30% risk for a coronary event by age 60 years and men are at 50% risk by age 50 years.     Mr. Angel' first degree relatives are at 50% risk to inherit the gene and should have tested to determine appropriate screening and management. Recurrence risks are 50%. Mr. Angel is already closely followed by endocrinology for his high cholesterol. At the previous appointment, I referred to cardiology. He plans to schedule that visit soon.     RECOMMENDATIONS:   Genetic testing for at-risk relatives  Continue to follow with endocrine for cholesterol management  Cardiology referral (placed last visit)      TIME SPENT: 15 minutes     Yun Kauffman, MPH, MS, [facility]  Genetic Counselor   [facility]    Katelyn Simmons M.D.                                                                                   Medical Geneticist                                                                                                               [facility]

## 2024-03-01 ENCOUNTER — PATIENT MESSAGE (OUTPATIENT)
Dept: GENETICS | Facility: CLINIC | Age: 24
End: 2024-03-01
Payer: COMMERCIAL

## 2024-03-07 ENCOUNTER — OFFICE VISIT (OUTPATIENT)
Dept: INTERNAL MEDICINE | Facility: CLINIC | Age: 24
End: 2024-03-07
Payer: COMMERCIAL

## 2024-03-07 VITALS
DIASTOLIC BLOOD PRESSURE: 67 MMHG | SYSTOLIC BLOOD PRESSURE: 114 MMHG | BODY MASS INDEX: 21.58 KG/M2 | HEART RATE: 75 BPM | OXYGEN SATURATION: 100 % | HEIGHT: 71 IN | WEIGHT: 154.13 LBS

## 2024-03-07 DIAGNOSIS — R05.3 CHRONIC COUGH: Primary | ICD-10-CM

## 2024-03-07 DIAGNOSIS — L73.9 FOLLICULITIS: ICD-10-CM

## 2024-03-07 PROCEDURE — 99213 OFFICE O/P EST LOW 20 MIN: CPT | Mod: S$GLB,,, | Performed by: STUDENT IN AN ORGANIZED HEALTH CARE EDUCATION/TRAINING PROGRAM

## 2024-03-07 PROCEDURE — 99999 PR PBB SHADOW E&M-EST. PATIENT-LVL IV: CPT | Mod: PBBFAC,,, | Performed by: STUDENT IN AN ORGANIZED HEALTH CARE EDUCATION/TRAINING PROGRAM

## 2024-03-07 RX ORDER — MUPIROCIN 20 MG/G
OINTMENT TOPICAL 3 TIMES DAILY
Qty: 1 EACH | Refills: 1 | Status: SHIPPED | OUTPATIENT
Start: 2024-03-07 | End: 2025-03-07

## 2024-03-07 NOTE — PROGRESS NOTES
Subjective:       Patient ID: Melinda Angel is a 23 y.o. male.    Chief Complaint: Cough    HPI    Melinda Angel is a 23 y.o. male , English speaking, with a history of:  ADD   Familial hypercholesterolemia 2/2 monoallelic mutation LDLR gene  Subclinical hypothyroidism      [Local Patient]  Originally from [facility]  Lives in: [place] [address]       Patient comes to the clinic complaining of cough.      Patient states that 2 weeks ago he had upper respiratory infection, accompanied with fever, chills, malaise, cough.  He went to an urgent care because he had too much cough and he was nauseated and producing mucous every time he coughed.    He was tested for COVID and influenza which were negative.  He was discharged home on short course of steroids and antihistamines.      He states that he still has cough but is not as bad as he used to be.  He has not using the prescribed Flonase or albuterol.  He denies having fever, chills, malaise, headaches, sputum is still white.      In addition, patient noticed a bump in the lower part of his abdomen, it is red, sometimes it hurts, he has not noticed it has drained.      Latest PCP visits:      10/13/2023: Annual wellness visit through [email or web address] [facility], hyperlipidemia, referral to endocrinology      Changes in health or medications: No    Specialists visits and recommendations:        H/o ER or Urgent care visits:   NO    H/o Hospitalizations:  NO    H/o falls: None     Life events / lifestyle:   Nothing new      Most recent / available laboratories reviewed with the patient:    Recent Labs   Lab 10/13/23  0756   WBC 7.30   Hemoglobin 15.4   Hematocrit 46.1   MCV 91   Platelets 292       Recent Labs   Lab 10/13/23  0756   Glucose 102   Sodium 137   Potassium 4.0   BUN 10   Creatinine 0.9   Total Bilirubin 0.4   AST 19   ALT 29       Recent Labs   Lab 10/13/23  0756   Hemoglobin A1C 5.2       Recent Labs   Lab 10/13/23  0756 01/09/24  0821   Cholesterol 429 H 390 H  "  Triglycerides 184 H 148   HDL 43 46   LDL Cholesterol 349.2 H 314.4 H   Non-HDL Cholesterol 386 344       Recent Labs   Lab 10/13/23  0756 12/08/23  0815   TSH 4.716 H 1.888               Current medications:    Current Outpatient Medications:     albuterol (PROVENTIL/VENTOLIN HFA) 90 mcg/actuation inhaler, Inhale 1-2 puffs into the lungs every 6 (six) hours as needed. Rescue, Disp: 8 g, Rfl: 0    cetirizine (ZYRTEC) 10 MG tablet, Take 1 tablet (10 mg total) by mouth once daily., Disp: 30 tablet, Rfl: 0    fluticasone propionate (FLONASE) 50 mcg/actuation nasal spray, 1 spray (50 mcg total) by Each Nostril route 2 (two) times daily as needed for Rhinitis., Disp: 15 g, Rfl: 0    methylphenidate HCl (RITALIN) 10 MG tablet, Take 1 and 1/2 tablet po Q 3 pm daily for extended study time, Disp: 45 tablet, Rfl: 0    rosuvastatin (CRESTOR) 40 MG Tab, Take 1 tablet (40 mg total) by mouth every evening., Disp: 30 tablet, Rfl: 11    mupirocin (BACTROBAN) 2 % ointment, Apply topically 3 (three) times daily., Disp: 1 each, Rfl: 1      Healthcare Maintenance:  Colon cancer screening: n/a        Colon cancer screening:   N/A  Vaccinations: Pneumonia, Zoster, Tetanus (no)  COVID vaccination: completed  x 3   Depression screening: PHQ2 score = 0     Annual visit approx. Month: October ROS  11-point review of systems done. Negative except for detailed in the HPI.        Objective:      /67   Pulse 75   Ht 5' 11" (1.803 m)   Wt 69.9 kg (154 lb 1.6 oz)   SpO2 100%   BMI 21.49 kg/m²      Physical Exam   General appearance: Good general aspect, NAD, conversant   Eyes and HEENT: Normal sclerae, moist mucous membranes, no thyromegaly or lymphadenopathy  Respiratory: No work of breathing, clear to auscultation bilaterally. No rales, rhonchi, wheezing, or rubs.  Cardiovascular: PMI not displaced. RRR. Normal S1, S2. No murmurs, rubs or gallops.  Abdomen and : Soft, non-tender, nondistended, BS, no hepatosplenomegaly, no " masses.  Extremities: no edemas, no extremity lymphadenopathy, no clubbing, no cyanosis.  Nervous System: Alert and oriented x 3, good concentration, no deficits.  Skin: Normal temperature, turgor and texture; no rash, ulcers or subcutaneous nodules.  Small abscess located on skin of hypogastric area.  Psych: Appropriate affect, alert and oriented to person, place and time          Assessment:       1. Chronic cough  Comments:  Residual cough after URI. Continue daily antihistamin medication, nasal steroid, symptomatic treatment    2. Folliculitis  Assessment & Plan:  Folliculitis observed over hypogastric area  I will treat with topical ATB.    Orders:  -     mupirocin (BACTROBAN) 2 % ointment; Apply topically 3 (three) times daily.  Dispense: 1 each; Refill: 1       Summary of orders / plan:         Continue symptomatic treatment.    Bactroban            Patient Instructions   Recommendations:  Use your nasal spray as directed (reduce inflammation of the airway)  Take your daily antihistamine medication  Use your inhaler (oral) at least twice a day until cough is gone.  Drink warm beverages only if possible  May take warm tea with honey  If secretions turn green, headache gets worse, or you get a fever, write back.       Problem list updated  Medications reconciled  Education provided  Lifestyle recommendations given  AVS generated, explained, and sent to the patient.  RTC in :  October for annual wellness visit with [employer], labs not ordered yet            YANCI VILLAVICENCIO MD, MPH  Internal Medicine  [facility]  [facility]

## 2024-03-07 NOTE — PATIENT INSTRUCTIONS
Recommendations:  Use your nasal spray as directed (reduce inflammation of the airway)  Take your daily antihistamine medication  Use your inhaler (oral) at least twice a day until cough is gone.  Drink warm beverages only if possible  May take warm tea with honey  If secretions turn green, headache gets worse, or you get a fever, write back.

## 2024-05-13 ENCOUNTER — TELEPHONE (OUTPATIENT)
Dept: ENDOCRINOLOGY | Facility: CLINIC | Age: 24
End: 2024-05-13
Payer: COMMERCIAL

## 2024-05-13 NOTE — TELEPHONE ENCOUNTER
Called no answer. Left message.      ----- Message from Victoria GLOVER Rai, MD sent at 5/13/2024 12:27 PM CDT -----    Please have patient call to schedule his fasting labs.  ----- Message -----  From: Sheila Hyman MA  Sent: 5/13/2024   8:06 AM CDT  To: Victoria GLOVER Rai, MD      ----- Message -----  From: SYSTEM  Sent: 5/11/2024   1:25 AM CDT  To: [facility] Clinical Support Staff

## 2024-05-27 ENCOUNTER — LAB VISIT (OUTPATIENT)
Dept: LAB | Facility: HOSPITAL | Age: 24
End: 2024-05-27
Payer: COMMERCIAL

## 2024-05-27 DIAGNOSIS — E78.2 MIXED HYPERLIPIDEMIA: ICD-10-CM

## 2024-05-27 LAB
CHOLEST SERPL-MCNC: 199 MG/DL (ref 120–199)
CHOLEST/HDLC SERPL: 4.1 {RATIO} (ref 2–5)
HDLC SERPL-MCNC: 48 MG/DL (ref 40–75)
HDLC SERPL: 24.1 % (ref 20–50)
LDLC SERPL CALC-MCNC: 130.4 MG/DL (ref 63–159)
NONHDLC SERPL-MCNC: 151 MG/DL
TRIGL SERPL-MCNC: 103 MG/DL (ref 30–150)

## 2024-05-27 PROCEDURE — 36415 COLL VENOUS BLD VENIPUNCTURE: CPT | Mod: PN | Performed by: INTERNAL MEDICINE

## 2024-05-27 PROCEDURE — 80061 LIPID PANEL: CPT | Performed by: INTERNAL MEDICINE

## 2024-06-19 DIAGNOSIS — E78.01: Primary | ICD-10-CM

## 2024-12-06 ENCOUNTER — LAB VISIT (OUTPATIENT)
Dept: LAB | Facility: HOSPITAL | Age: 24
End: 2024-12-06
Attending: INTERNAL MEDICINE
Payer: COMMERCIAL

## 2024-12-06 DIAGNOSIS — E78.01: ICD-10-CM

## 2024-12-06 LAB
CHOLEST SERPL-MCNC: 190 MG/DL (ref 120–199)
CHOLEST/HDLC SERPL: 4.4 {RATIO} (ref 2–5)
HDLC SERPL-MCNC: 43 MG/DL (ref 40–75)
HDLC SERPL: 22.6 % (ref 20–50)
LDLC SERPL CALC-MCNC: 126 MG/DL (ref 63–159)
NONHDLC SERPL-MCNC: 147 MG/DL
TRIGL SERPL-MCNC: 105 MG/DL (ref 30–150)

## 2024-12-06 PROCEDURE — 80061 LIPID PANEL: CPT | Performed by: INTERNAL MEDICINE

## 2024-12-06 PROCEDURE — 36415 COLL VENOUS BLD VENIPUNCTURE: CPT | Mod: PN | Performed by: INTERNAL MEDICINE

## 2024-12-10 ENCOUNTER — PATIENT MESSAGE (OUTPATIENT)
Dept: ENDOCRINOLOGY | Facility: CLINIC | Age: 24
End: 2024-12-10
Payer: COMMERCIAL

## 2025-02-14 ENCOUNTER — OFFICE VISIT (OUTPATIENT)
Dept: URGENT CARE | Facility: CLINIC | Age: 25
End: 2025-02-14
Payer: COMMERCIAL

## 2025-02-14 VITALS
TEMPERATURE: 98 F | SYSTOLIC BLOOD PRESSURE: 100 MMHG | HEIGHT: 71 IN | HEART RATE: 72 BPM | BODY MASS INDEX: 21.58 KG/M2 | OXYGEN SATURATION: 97 % | WEIGHT: 154.13 LBS | RESPIRATION RATE: 16 BRPM | DIASTOLIC BLOOD PRESSURE: 67 MMHG

## 2025-02-14 DIAGNOSIS — K21.9 GASTROESOPHAGEAL REFLUX DISEASE WITHOUT ESOPHAGITIS: ICD-10-CM

## 2025-02-14 DIAGNOSIS — R09.82 POSTNASAL DRIP: Primary | ICD-10-CM

## 2025-02-14 RX ORDER — LEVOCETIRIZINE DIHYDROCHLORIDE 5 MG/1
5 TABLET, FILM COATED ORAL NIGHTLY
Qty: 14 TABLET | Refills: 0 | Status: SHIPPED | OUTPATIENT
Start: 2025-02-14 | End: 2025-02-28

## 2025-02-14 RX ORDER — AZELASTINE HYDROCHLORIDE, FLUTICASONE PROPIONATE 137; 50 UG/1; UG/1
1 SPRAY, METERED NASAL 2 TIMES DAILY
Qty: 23 G | Refills: 0 | Status: SHIPPED | OUTPATIENT
Start: 2025-02-14

## 2025-02-14 RX ORDER — PANTOPRAZOLE SODIUM 40 MG/1
40 TABLET, DELAYED RELEASE ORAL EVERY MORNING
Qty: 30 TABLET | Refills: 0 | Status: SHIPPED | OUTPATIENT
Start: 2025-02-14

## 2025-02-14 NOTE — PATIENT INSTRUCTIONS
If dymista is not affordable then use separate nasal sprays- azelastine and over the counter flonase.    If still no improvement talk to your primary care provider about seeing an ENT

## 2025-02-14 NOTE — PROGRESS NOTES
"Subjective:      Patient ID: Melinda Angel is a 24 y.o. male.    Vitals:  height is 5' 11" (1.803 m) and weight is 69.9 kg (154 lb 1.6 oz). His oral temperature is 98.4 °F (36.9 °C). His blood pressure is 100/67 and his pulse is 72. His respiration is 16 and oxygen saturation is 97%.     Chief Complaint: Sore Throat    Patient is a 24 y.o. male with the compliant of a sore throat that comes on only in the morning from coughing up mucus. Intentionally vomits every morning to get the mucus out of his throat and sometimes chokes on his saliva for about 1 year. He considers postnasal drip to be the major contributing factor and recalls that when taking nasal sprays last year that he had improvement of his symptoms. Denies mono or strep exposure. Does not want any viral testing.    Sore Throat   This is a chronic problem. The current episode started more than 1 year ago. The problem has been unchanged. Neither side of throat is experiencing more pain than the other. There has been no fever. The pain is at a severity of 0/10. The patient is experiencing no pain. Associated symptoms include coughing. Pertinent negatives include no abdominal pain, congestion, diarrhea, drooling, ear discharge, ear pain, headaches, hoarse voice, plugged ear sensation, neck pain, shortness of breath, stridor, swollen glands, trouble swallowing or vomiting. He has had no exposure to strep or mono. Treatments tried: vapor rub and claritin. The treatment provided no relief.       HENT:  Positive for sore throat. Negative for ear pain, ear discharge, drooling, congestion and trouble swallowing.    Neck: Negative for neck pain.   Respiratory:  Positive for cough. Negative for shortness of breath and stridor.    Gastrointestinal:  Negative for abdominal pain, vomiting and diarrhea.   Neurological:  Negative for headaches.      Objective:     Vitals:    02/14/25 0959   BP: 100/67   BP Location: Left arm   Patient Position: Sitting   Pulse: 72 " "  Resp: 16   Temp: 98.4 °F (36.9 °C)   TempSrc: Oral   SpO2: 97%   Weight: 69.9 kg (154 lb 1.6 oz)   Height: 5' 11" (1.803 m)      Physical Exam   Constitutional: He is oriented to person, place, and time.  Non-toxic appearance. He does not appear ill. No distress.   HENT:   Head: Atraumatic.   Ears:   Right Ear: Tympanic membrane normal.   Left Ear: Tympanic membrane normal.   Nose: Rhinorrhea present. No congestion.   Mouth/Throat: Posterior oropharyngeal erythema present. No oropharyngeal exudate.   Eyes: Conjunctivae are normal.   Neck: Neck supple.   Cardiovascular: Normal rate, regular rhythm, normal heart sounds and normal pulses.   Pulmonary/Chest: Effort normal and breath sounds normal.   Abdominal: Bowel sounds are normal. Soft. There is no rebound.   Lymphadenopathy:     He has no cervical adenopathy.   Neurological: He is alert and oriented to person, place, and time.   Skin: Skin is not diaphoretic.   Psychiatric: Judgment and thought content normal.       Assessment:     1. Postnasal drip    2. Gastroesophageal reflux disease without esophagitis        Plan:       Postnasal drip  -     azelastine-fluticasone (DYMISTA) 137-50 mcg/spray Spry nassal spray; 1 spray by Each Nostril route 2 (two) times daily. If not affordable, then dispense azelastine 137 mcg 1 spray BID # 30 ML  Dispense: 23 g; Refill: 0  -     levocetirizine (XYZAL) 5 MG tablet; Take 1 tablet (5 mg total) by mouth every evening. for 14 days  Dispense: 14 tablet; Refill: 0    2. Gastroesophageal reflux disease without esophagitis  -     pantoprazole (PROTONIX) 40 MG tablet; Take 1 tablet (40 mg total) by mouth every morning.  Dispense: 30 tablet; Refill: 0        Declined all point of care testing    Patient Instructions   If dymista is not affordable then use separate nasal sprays- azelastine and over the counter flonase.    If still no improvement talk to your primary care provider about seeing an ENT                 "

## 2025-04-03 ENCOUNTER — OFFICE VISIT (OUTPATIENT)
Dept: INTERNAL MEDICINE | Facility: CLINIC | Age: 25
End: 2025-04-03
Payer: COMMERCIAL

## 2025-04-03 ENCOUNTER — CLINICAL SUPPORT (OUTPATIENT)
Dept: INTERNAL MEDICINE | Facility: CLINIC | Age: 25
End: 2025-04-03
Payer: COMMERCIAL

## 2025-04-03 ENCOUNTER — RESULTS FOLLOW-UP (OUTPATIENT)
Dept: INTERNAL MEDICINE | Facility: CLINIC | Age: 25
End: 2025-04-03

## 2025-04-03 VITALS
HEART RATE: 89 BPM | WEIGHT: 163.81 LBS | SYSTOLIC BLOOD PRESSURE: 107 MMHG | DIASTOLIC BLOOD PRESSURE: 69 MMHG | BODY MASS INDEX: 22.93 KG/M2 | HEIGHT: 71 IN

## 2025-04-03 DIAGNOSIS — E03.8 SUBCLINICAL HYPOTHYROIDISM: ICD-10-CM

## 2025-04-03 DIAGNOSIS — E78.01: ICD-10-CM

## 2025-04-03 DIAGNOSIS — Z00.00 HEALTHCARE MAINTENANCE: ICD-10-CM

## 2025-04-03 DIAGNOSIS — G47.10 HYPERSOMNIA, UNSPECIFIED: ICD-10-CM

## 2025-04-03 DIAGNOSIS — Z00.00 ROUTINE GENERAL MEDICAL EXAMINATION AT A HEALTH CARE FACILITY: Primary | ICD-10-CM

## 2025-04-03 DIAGNOSIS — F98.8 ATTENTION DEFICIT DISORDER, UNSPECIFIED TYPE: ICD-10-CM

## 2025-04-03 PROBLEM — E78.5 HYPERLIPIDEMIA: Status: RESOLVED | Noted: 2023-10-13 | Resolved: 2025-04-03

## 2025-04-03 PROBLEM — L73.9 FOLLICULITIS: Status: RESOLVED | Noted: 2024-03-07 | Resolved: 2025-04-03

## 2025-04-03 LAB
ALBUMIN SERPL BCP-MCNC: 4.5 G/DL (ref 3.5–5.2)
ALP SERPL-CCNC: 78 UNIT/L (ref 40–150)
ALT SERPL W/O P-5'-P-CCNC: 23 UNIT/L (ref 10–44)
ANION GAP (OHS): 10 MMOL/L (ref 8–16)
AST SERPL-CCNC: 17 UNIT/L (ref 11–45)
BILIRUB SERPL-MCNC: 0.4 MG/DL (ref 0.1–1)
BUN SERPL-MCNC: 10 MG/DL (ref 6–20)
CALCIUM SERPL-MCNC: 9.5 MG/DL (ref 8.7–10.5)
CHLORIDE SERPL-SCNC: 108 MMOL/L (ref 95–110)
CHOLEST SERPL-MCNC: 179 MG/DL (ref 120–199)
CHOLEST/HDLC SERPL: 3.6 {RATIO} (ref 2–5)
CO2 SERPL-SCNC: 28 MMOL/L (ref 23–29)
CREAT SERPL-MCNC: 0.8 MG/DL (ref 0.5–1.4)
EAG (OHS): 103 MG/DL (ref 68–131)
ERYTHROCYTE [DISTWIDTH] IN BLOOD BY AUTOMATED COUNT: 13.2 % (ref 11.5–14.5)
GFR SERPLBLD CREATININE-BSD FMLA CKD-EPI: >60 ML/MIN/1.73/M2
GLUCOSE SERPL-MCNC: 100 MG/DL (ref 70–110)
HBA1C MFR BLD: 5.2 % (ref 4–5.6)
HCT VFR BLD AUTO: 48.5 % (ref 40–54)
HCV AB SERPL QL IA: NORMAL
HDLC SERPL-MCNC: 50 MG/DL (ref 40–75)
HDLC SERPL: 27.9 % (ref 20–50)
HGB BLD-MCNC: 15.3 GM/DL (ref 14–18)
HIV 1+2 AB+HIV1 P24 AG SERPL QL IA: NORMAL
LDLC SERPL CALC-MCNC: 114.6 MG/DL (ref 63–159)
MCH RBC QN AUTO: 30.1 PG (ref 27–31)
MCHC RBC AUTO-ENTMCNC: 31.5 G/DL (ref 32–36)
MCV RBC AUTO: 95 FL (ref 82–98)
NONHDLC SERPL-MCNC: 129 MG/DL
PLATELET # BLD AUTO: 280 K/UL (ref 150–450)
PMV BLD AUTO: 9.2 FL (ref 9.2–12.9)
POTASSIUM SERPL-SCNC: 4.8 MMOL/L (ref 3.5–5.1)
PROT SERPL-MCNC: 7.5 GM/DL (ref 6–8.4)
RBC # BLD AUTO: 5.09 M/UL (ref 4.6–6.2)
SODIUM SERPL-SCNC: 146 MMOL/L (ref 136–145)
TRIGL SERPL-MCNC: 72 MG/DL (ref 30–150)
WBC # BLD AUTO: 6.75 K/UL (ref 3.9–12.7)

## 2025-04-03 PROCEDURE — 82465 ASSAY BLD/SERUM CHOLESTEROL: CPT

## 2025-04-03 PROCEDURE — 85027 COMPLETE CBC AUTOMATED: CPT

## 2025-04-03 PROCEDURE — 86803 HEPATITIS C AB TEST: CPT

## 2025-04-03 PROCEDURE — 99999 PR PBB SHADOW E&M-EST. PATIENT-LVL III: CPT | Mod: PBBFAC,,, | Performed by: STUDENT IN AN ORGANIZED HEALTH CARE EDUCATION/TRAINING PROGRAM

## 2025-04-03 PROCEDURE — 36415 COLL VENOUS BLD VENIPUNCTURE: CPT

## 2025-04-03 PROCEDURE — 90715 TDAP VACCINE 7 YRS/> IM: CPT | Mod: S$GLB,,, | Performed by: STUDENT IN AN ORGANIZED HEALTH CARE EDUCATION/TRAINING PROGRAM

## 2025-04-03 PROCEDURE — 84460 ALANINE AMINO (ALT) (SGPT): CPT

## 2025-04-03 PROCEDURE — 87389 HIV-1 AG W/HIV-1&-2 AB AG IA: CPT

## 2025-04-03 PROCEDURE — 83036 HEMOGLOBIN GLYCOSYLATED A1C: CPT

## 2025-04-03 PROCEDURE — 90471 IMMUNIZATION ADMIN: CPT | Mod: S$GLB,,, | Performed by: STUDENT IN AN ORGANIZED HEALTH CARE EDUCATION/TRAINING PROGRAM

## 2025-04-03 PROCEDURE — 99999 PR PBB SHADOW E&M-EST. PATIENT-LVL I: CPT | Mod: PBBFAC,,,

## 2025-04-03 RX ORDER — PNEUMOCOCCAL 20-VALENT CONJUGATE VACCINE 2.2; 2.2; 2.2; 2.2; 2.2; 2.2; 2.2; 2.2; 2.2; 2.2; 2.2; 2.2; 2.2; 2.2; 2.2; 2.2; 4.4; 2.2; 2.2; 2.2 UG/.5ML; UG/.5ML; UG/.5ML; UG/.5ML; UG/.5ML; UG/.5ML; UG/.5ML; UG/.5ML; UG/.5ML; UG/.5ML; UG/.5ML; UG/.5ML; UG/.5ML; UG/.5ML; UG/.5ML; UG/.5ML; UG/.5ML; UG/.5ML; UG/.5ML; UG/.5ML
0.5 INJECTION, SUSPENSION INTRAMUSCULAR ONCE
Qty: 0.5 ML | Refills: 0 | Status: SHIPPED | OUTPATIENT
Start: 2025-04-03 | End: 2025-04-03

## 2025-04-03 NOTE — ASSESSMENT & PLAN NOTE
Lab Results   Component Value Date    CHOL 179 04/03/2025    CHOL 190 12/06/2024    CHOL 199 05/27/2024     Lab Results   Component Value Date    HDL 50 04/03/2025    HDL 43 12/06/2024    HDL 48 05/27/2024     Lab Results   Component Value Date    LDLCALC 126.0 12/06/2024    LDLCALC 130.4 05/27/2024    LDLCALC 314.4 (H) 01/09/2024     Lab Results   Component Value Date    TRIG 72 04/03/2025    TRIG 105 12/06/2024    TRIG 103 05/27/2024       Lab Results   Component Value Date    CHOLHDL 27.9 04/03/2025    CHOLHDL 22.6 12/06/2024    CHOLHDL 24.1 05/27/2024     Improved.    Continue rosuvastatin.  Refills not needed today.

## 2025-04-03 NOTE — ASSESSMENT & PLAN NOTE
Health care maintenance and core gaps reviewed and assessed with patient.  Vaccinations recommended:        Tetanus - O       Shingles - N/A       Influenza - O       Pneumonia - O  Colon cancer screening:   Colonoscopy: N/A  Lifestyle recommendations given.  Physical activity recommended.    Legend: Ordered (O), Declined (D), Current (C)

## 2025-04-03 NOTE — ASSESSMENT & PLAN NOTE
Possibly 2/2 lifestyle and use of marihuana  I have extensively educated patient about sleep hygiene and minimize in the use of psychoactive substances.    Patient verbalized understanding and agrees with the plan.

## 2025-04-03 NOTE — PROGRESS NOTES
Subjective:       Patient ID: Melinda Angel is a 24 y.o. male.    Chief Complaint: Annual Exam    HPI    Melinda Angel is a 24 y.o. male , English speaking, with a history of:  ADD not on medication  Familial hypercholesterolemia due to monoallelic mutation in LDLR gene         [Local Patient]  Originally from [facility]  Lives in: [facility] [id number]       Patient comes to the clinic for a general physical exam (Annual Wellness Visit) through [facility].    Patient is currently asymptomatic and his only concern today is over sleeping on the weekends.    He maintains different sleep patterns between weekdays and weekends. During weekdays, he sleeps from 12:30-1 AM to 8 AM. On weekends, he sleeps from 1 AM to 4-5 AM.    He attends gym 1-2 times per week and engages in regular biking activities. He reports difficulty maintaining adequate food intake despite having an appetite. He expresses commitment to maintaining a healthier diet than his peers.    He is still not on medication for his ADHD, but denies any impact on work performance. He previously took methylphenidate (Ritalin), with last use in 2020 and more frequent use in [id number].    He takes rosuvastatin daily for his hypercholesterolemia as recommended by Endocrinology, and allergy medications as needed for congestion.    He admits he eats fast food and uses medicinal marijuana, approximately 3.5 g weekly. He denies use of other recreational drugs.    Patient denies CV symptoms, CP, SOB, palpitations.  Patient denies constitutional symptoms, fever, changes in the urine or stool.    No other complaints      Latest PCP visits:      3/7/2024 Chronic cough  10/13/23 AWV     Changes in health or medications: No    Specialists visits and recommendations:        H/o ER or Urgent care visits:   NO    H/o Hospitalizations:  NO    H/o falls: None     Life events / lifestyle:   Nothing new      Most recent / available laboratories and studies reviewed with the  patient:    Recent Labs   Lab 10/13/23  0756 04/03/25  1218   WBC 7.30 6.75   Hemoglobin 15.4  --    HGB  --  15.3   Hematocrit 46.1  --    HCT  --  48.5   MCV 91 95   Platelet Count  --  280   Platelets 292  --        Recent Labs   Lab 10/13/23  0756 04/03/25  1218   Glucose 102  --    Sodium 137 146 H   Potassium 4.0 4.8   BUN 10 10   Creatinine 0.9 0.8   Total Bilirubin 0.4  --    Bilirubin Total  --  0.4   AST 19 17   ALT 29 23       Recent Labs   Lab 10/13/23  0756 04/03/25  1218   Hemoglobin A1C 5.2  --    Hemoglobin A1c  --  5.2       Recent Labs   Lab 01/09/24  0821 05/27/24  0910 12/06/24  0848 04/03/25  1218   Cholesterol Total  --   --   --  179   Cholesterol 390 H 199 190  --    Triglycerides 148 103 105  --    Triglyceride  --   --   --  72   HDL 46 48 43  --    HDL Cholesterol  --   --   --  50   LDL Cholesterol 314.4 H 130.4 126.0  --    Non-HDL Cholesterol 344 151 147  --    Non HDL Cholesterol  --   --   --  129       Recent Labs   Lab 10/13/23  0756 12/08/23  0815   TSH 4.716 H 1.888       Recent Labs   Lab 04/03/25  1218   HIV 1/2 Ag/Ab Non-Reactive   Hep C Ab Interp Non-Reactive       No results found for this or any previous visit.    X-Ray Chest PA And Lateral  Narrative: EXAMINATION:  XR CHEST PA AND LATERAL    CLINICAL HISTORY:  Cough, unspecified    TECHNIQUE:  PA and lateral views of the chest were performed.    COMPARISON:  None    FINDINGS:  The lungs are clear, with normal appearance of pulmonary vasculature and no pleural effusion or pneumothorax.    The cardiac silhouette is normal in size. The hilar and mediastinal contours are unremarkable.    Visualized osseous structures show no acute abnormalities.  Impression: No acute radiographic findings in the chest.    Electronically signed by: Lucas Daniel MD  Date:    02/21/2024  Time:    16:10       Current medications:  Current Medications[1]      Healthcare Maintenance:  Colon cancer screening:         Vaccinations:        Tetanus:  "2013       Pneumonia: n/a       Zoster: n/a       Influenza: n/a       COVID vaccination: completed x 4    Depression screening: PHQ2 score = 0    Annual Wellness visit approx. Month: APRIL    Past Medical History reviewed and updated:    Past Medical History:   Diagnosis Date    ADD (attention deficit disorder) 2012    Familial hypercholesterolemia     Subclinical hypothyroidism        History reviewed. No pertinent surgical history.    Family History   Problem Relation Name Age of Onset    Hyperlipidemia Mother      Hypercalcemia Maternal Grandfather         ROS  11-point review of systems done. Negative except for detailed in the HPI.        Objective:      /69 (Patient Position: Sitting)   Pulse 89   Ht 5' 11" (1.803 m)   Wt 74.3 kg (163 lb 12.8 oz)   BMI 22.85 kg/m²      Physical Exam   General appearance: Good general aspect, NAD, conversant   Eyes and HEENT: Normal sclerae, moist mucous membranes, no thyromegaly or lymphadenopathy  Respiratory: No work of breathing, clear to auscultation bilaterally. No rales, rhonchi, wheezing, or rubs.  Cardiovascular: PMI not displaced. RRR. Normal S1, S2. No murmurs, rubs or gallops.  Abdomen and : Soft, non-tender, nondistended, BS, no hepatosplenomegaly, no masses.  Extremities: no edemas, no extremity lymphadenopathy, no clubbing, no cyanosis.  Nervous System: Alert and oriented x 3, good concentration, no deficits.  Skin: Normal temperature, turgor and texture; no rash, ulcers or subcutaneous nodules  Psych: Appropriate affect, alert and oriented to person, place and time          Assessment:       1. Routine general medical examination at a health care facility  Assessment & Plan:  Patient is in overall good general health.  Stable medical conditions listed on the PMH.  Labs reviewed and patient notified.      Orders:  -     CBC Auto Differential; Future; Expected date: 04/03/2025  -     Urinalysis, Reflex to Urine Culture; Future; Expected date: " 04/03/2025    2. Attention deficit disorder, unspecified type  Overview:  Dx at age 11    Assessment & Plan:  Stable, unchanged.  Continue observation.      3. Familial hypercholesterolemia due to monoallelic mutation in LDLR gene  Assessment & Plan:  Lab Results   Component Value Date    CHOL 179 04/03/2025    CHOL 190 12/06/2024    CHOL 199 05/27/2024     Lab Results   Component Value Date    HDL 50 04/03/2025    HDL 43 12/06/2024    HDL 48 05/27/2024     Lab Results   Component Value Date    LDLCALC 126.0 12/06/2024    LDLCALC 130.4 05/27/2024    LDLCALC 314.4 (H) 01/09/2024     Lab Results   Component Value Date    TRIG 72 04/03/2025    TRIG 105 12/06/2024    TRIG 103 05/27/2024       Lab Results   Component Value Date    CHOLHDL 27.9 04/03/2025    CHOLHDL 22.6 12/06/2024    CHOLHDL 24.1 05/27/2024     Improved.    Continue rosuvastatin.  Refills not needed today.    Orders:  -     Comprehensive Metabolic Panel; Future; Expected date: 04/03/2025  -     Hemoglobin A1C; Future; Expected date: 04/03/2025  -     Lipid Panel; Future; Expected date: 04/03/2025  -     TSH; Future; Expected date: 04/03/2025    4. Subclinical hypothyroidism  Assessment & Plan:  TSH ordered      5. Hypersomnia, unspecified  Assessment & Plan:  Possibly 2/2 lifestyle and use of marihuana  I have extensively educated patient about sleep hygiene and minimize in the use of psychoactive substances.    Patient verbalized understanding and agrees with the plan.      6. Healthcare maintenance  Assessment & Plan:  Health care maintenance and core gaps reviewed and assessed with patient.  Vaccinations recommended:        Tetanus - O       Shingles - N/A       Influenza - O       Pneumonia - O  Colon cancer screening:   Colonoscopy: N/A  Lifestyle recommendations given.  Physical activity recommended.    Legend: Ordered (O), Declined (D), Current (C)      Orders:  -     Tdap Vaccine; Future  -     pneumoc 20-jarod conj-dip cr,PF, (PREVNAR 20, PF,) 0.5  mL Syrg injection; Inject 0.5 mLs into the muscle once. for 1 dose  Dispense: 0.5 mL; Refill: 0  -     Hepatitis C Antibody; Future; Expected date: 04/03/2025  -     HIV 1/2 Ag/Ab (4th Gen); Future; Expected date: 04/03/2025       Summary of orders / plan:         IMPRESSION: Assessed overall health status, noting no major concerns aside from previously diagnosed hypercholesterolemia. Evaluated sleep patterns, considering potential impact of marijuana use on hypersomnia. Reviewed management of ADD, noting preference to discontinue methylphenidate due to side effects. Determined need for routine health maintenance, including vaccinations and screening labs.     PLAN SUMMARY: Ordered annual lab work including lipid panel Continue rosuvastatin at current dose for hypercholesterolemia management Advised to maintain physical activity and consider weightlifting for weight gain Recommend reducing marijuana consumption, especially on weekends Suggested implementing sleep hygiene practices, including consistent weekend sleep schedule Advised continuing use of OTC antihistamines for allergy symptoms as needed Counseled on maintaining healthy eating habits and increasing sun exposure               Patient Instructions   Vaccinations recommended:  Tdap            for Tetanus  Prevnar 20   for Pneumonia    Continue doing physical activity    Try to reduce consumption of marihuana     ACP discussion: Started  Problem list updated  Medications reconciled    Education provided  Lifestyle recommendations given  AVS generated, explained, and sent to the patient.  RTC in : 1 year for annual wellness visit  Labs: Not ordered yet            YANCI VILLAVICENCIO MD, MPH  Internal Medicine  [facility]  [facility]                 [1]   Current Outpatient Medications:     azelastine-fluticasone (DYMISTA) 137-50 mcg/spray Spry nassal spray, 1 spray by Each Nostril route 2 (two) times daily. If not affordable, then  dispense azelastine 137 mcg 1 spray BID # 30 ML, Disp: 23 g, Rfl: 0    rosuvastatin (CRESTOR) 40 MG Tab, Take 1 tablet (40 mg total) by mouth every evening., Disp: 30 tablet, Rfl: 11    albuterol (PROVENTIL/VENTOLIN HFA) 90 mcg/actuation inhaler, Inhale 1-2 puffs into the lungs every 6 (six) hours as needed. Rescue, Disp: 8 g, Rfl: 0    cetirizine (ZYRTEC) 10 MG tablet, Take 1 tablet (10 mg total) by mouth once daily., Disp: 30 tablet, Rfl: 0    levocetirizine (XYZAL) 5 MG tablet, Take 1 tablet (5 mg total) by mouth every evening. for 14 days, Disp: 14 tablet, Rfl: 0    pneumoc 20-jarod conj-dip cr,PF, (PREVNAR 20, PF,) 0.5 mL Syrg injection, Inject 0.5 mLs into the muscle once. for 1 dose, Disp: 0.5 mL, Rfl: 0

## 2025-04-03 NOTE — PATIENT INSTRUCTIONS
Vaccinations recommended:  Tdap            for Tetanus  Prevnar 20   for Pneumonia    Continue doing physical activity    Try to reduce consumption of marihuana

## 2025-04-25 ENCOUNTER — LAB VISIT (OUTPATIENT)
Dept: LAB | Facility: HOSPITAL | Age: 25
End: 2025-04-25
Payer: COMMERCIAL

## 2025-04-25 DIAGNOSIS — E78.01: ICD-10-CM

## 2025-04-25 DIAGNOSIS — E03.8 SUBCLINICAL HYPOTHYROIDISM: ICD-10-CM

## 2025-04-25 DIAGNOSIS — R73.09 ABNORMAL GLUCOSE: ICD-10-CM

## 2025-04-25 DIAGNOSIS — Z13.1 DIABETES MELLITUS SCREENING: ICD-10-CM

## 2025-04-25 LAB
ALBUMIN SERPL BCP-MCNC: 4.4 G/DL (ref 3.5–5.2)
ALP SERPL-CCNC: 79 UNIT/L (ref 40–150)
ALT SERPL W/O P-5'-P-CCNC: 33 UNIT/L (ref 10–44)
ANION GAP (OHS): 11 MMOL/L (ref 8–16)
AST SERPL-CCNC: 19 UNIT/L (ref 11–45)
BILIRUB SERPL-MCNC: 0.7 MG/DL (ref 0.1–1)
BUN SERPL-MCNC: 15 MG/DL (ref 6–20)
CALCIUM SERPL-MCNC: 9.5 MG/DL (ref 8.7–10.5)
CHLORIDE SERPL-SCNC: 105 MMOL/L (ref 95–110)
CHOLEST SERPL-MCNC: 191 MG/DL (ref 120–199)
CHOLEST/HDLC SERPL: 3.9 {RATIO} (ref 2–5)
CO2 SERPL-SCNC: 25 MMOL/L (ref 23–29)
CREAT SERPL-MCNC: 0.8 MG/DL (ref 0.5–1.4)
EAG (OHS): 105 MG/DL (ref 68–131)
GFR SERPLBLD CREATININE-BSD FMLA CKD-EPI: >60 ML/MIN/1.73/M2
GLUCOSE SERPL-MCNC: 94 MG/DL (ref 70–110)
HBA1C MFR BLD: 5.3 % (ref 4–5.6)
HDLC SERPL-MCNC: 49 MG/DL (ref 40–75)
HDLC SERPL: 25.7 % (ref 20–50)
LDLC SERPL CALC-MCNC: 126.6 MG/DL (ref 63–159)
NONHDLC SERPL-MCNC: 142 MG/DL
POTASSIUM SERPL-SCNC: 4.1 MMOL/L (ref 3.5–5.1)
PROT SERPL-MCNC: 7.4 GM/DL (ref 6–8.4)
SODIUM SERPL-SCNC: 141 MMOL/L (ref 136–145)
TRIGL SERPL-MCNC: 77 MG/DL (ref 30–150)
TSH SERPL-ACNC: 2.2 UIU/ML (ref 0.4–4)

## 2025-04-25 PROCEDURE — 36415 COLL VENOUS BLD VENIPUNCTURE: CPT | Mod: PN

## 2025-04-25 PROCEDURE — 83036 HEMOGLOBIN GLYCOSYLATED A1C: CPT

## 2025-04-25 PROCEDURE — 84443 ASSAY THYROID STIM HORMONE: CPT

## 2025-04-25 PROCEDURE — 82040 ASSAY OF SERUM ALBUMIN: CPT

## 2025-04-25 PROCEDURE — 82465 ASSAY BLD/SERUM CHOLESTEROL: CPT
